# Patient Record
Sex: FEMALE | Race: AMERICAN INDIAN OR ALASKA NATIVE | NOT HISPANIC OR LATINO | URBAN - METROPOLITAN AREA
[De-identification: names, ages, dates, MRNs, and addresses within clinical notes are randomized per-mention and may not be internally consistent; named-entity substitution may affect disease eponyms.]

---

## 2018-04-28 ENCOUNTER — EMERGENCY (EMERGENCY)
Facility: HOSPITAL | Age: 23
LOS: 1 days | Discharge: HOME | End: 2018-04-28
Attending: EMERGENCY MEDICINE | Admitting: EMERGENCY MEDICINE
Payer: COMMERCIAL

## 2018-04-28 VITALS
HEART RATE: 75 BPM | TEMPERATURE: 98 F | OXYGEN SATURATION: 99 % | DIASTOLIC BLOOD PRESSURE: 84 MMHG | RESPIRATION RATE: 16 BRPM | SYSTOLIC BLOOD PRESSURE: 126 MMHG

## 2018-04-28 VITALS
HEART RATE: 100 BPM | OXYGEN SATURATION: 98 % | DIASTOLIC BLOOD PRESSURE: 91 MMHG | RESPIRATION RATE: 20 BRPM | TEMPERATURE: 98 F | SYSTOLIC BLOOD PRESSURE: 133 MMHG

## 2018-04-28 DIAGNOSIS — Z91.018 ALLERGY TO OTHER FOODS: ICD-10-CM
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
## 2018-04-28 DIAGNOSIS — S01.511A LACERATION WITHOUT FOREIGN BODY OF LIP, INITIAL ENCOUNTER: ICD-10-CM
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
## 2018-04-28 DIAGNOSIS — S00.83XA CONTUSION OF OTHER PART OF HEAD, INITIAL ENCOUNTER: ICD-10-CM
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
## 2018-04-28 DIAGNOSIS — R51 HEADACHE: ICD-10-CM

## 2018-04-28 PROCEDURE — 99284 EMERGENCY DEPT VISIT MOD MDM: CPT | Mod: 25

## 2018-04-28 PROCEDURE — 70450 CT HEAD/BRAIN W/O DYE: CPT | Mod: 26

## 2018-04-28 NOTE — ED PROVIDER NOTE - SKIN, MLM
Large 5 cm R frontal hematoma.  1.5 cm laceration to the upper lip, does not extend through the mucosal surface

## 2018-04-28 NOTE — ED PROVIDER NOTE - OBJECTIVE STATEMENT
Accidental fall from the top bunk of her bed ~ 6 feet onto a hardwood floor.  Hit her head and face.  Denies LOC, nausea, dizziness.  Does complain of pain in her forehead.  Denies malocclusion or loose teeth.  Tetanus UTD.

## 2018-04-28 NOTE — ED PROVIDER NOTE - NEUROLOGICAL, MLM
Alert and oriented, no focal deficits, no motor or sensory deficits.  clear and coherent speech, normal cranial nerve exam, normal finger to nose and CHRISTIANE.  Steady gait.  No pronator drift.

## 2018-04-28 NOTE — ED PROVIDER NOTE - PROGRESS NOTE DETAILS
No intracranial findings on CT scan.  Patient requesting plastic surgeon for repair.  Dr. Castellano consulted for ED repair. Lips laceration closed by Dr. Spann- see note. Pt to f/u in her office out patient.

## 2018-04-28 NOTE — ED ADULT TRIAGE NOTE - CHIEF COMPLAINT QUOTE
Patient complaining of head injury/fall from approximately 6 feet out of her bed with possible LOC; patient with hematoma to forehead and laceration above lip; no nausea/vomiting; ice applied; no further bleed

## 2018-04-28 NOTE — ED PROVIDER NOTE - ENMT, MLM
Airway patent, Nasal mucosa clear. Mouth with normal mucosa.  No apical tenderness.  No malocclusion.  No tender facial bones.

## 2018-04-28 NOTE — ED PROVIDER NOTE - MEDICAL DECISION MAKING DETAILS
Fall from about 6 feet with head/lip injury.  Non focal neurological exam.  No c-spine tenderness.  Denies nausea, vomiting, malocclusion.  Imaging ordered, ice for pain.
